# Patient Record
Sex: MALE
[De-identification: names, ages, dates, MRNs, and addresses within clinical notes are randomized per-mention and may not be internally consistent; named-entity substitution may affect disease eponyms.]

---

## 2017-04-02 ENCOUNTER — RESULT REVIEW (OUTPATIENT)
Age: 67
End: 2017-04-02

## 2017-04-03 ENCOUNTER — APPOINTMENT (OUTPATIENT)
Dept: DERMATOLOGY | Facility: CLINIC | Age: 67
End: 2017-04-03

## 2017-04-03 ENCOUNTER — LABORATORY RESULT (OUTPATIENT)
Age: 67
End: 2017-04-03

## 2017-04-03 VITALS
WEIGHT: 205 LBS | HEIGHT: 75 IN | BODY MASS INDEX: 25.49 KG/M2 | SYSTOLIC BLOOD PRESSURE: 140 MMHG | DIASTOLIC BLOOD PRESSURE: 78 MMHG

## 2017-04-03 DIAGNOSIS — Z78.9 OTHER SPECIFIED HEALTH STATUS: ICD-10-CM

## 2017-04-03 DIAGNOSIS — Z80.8 FAMILY HISTORY OF MALIGNANT NEOPLASM OF OTHER ORGANS OR SYSTEMS: ICD-10-CM

## 2017-04-03 DIAGNOSIS — Z85.828 PERSONAL HISTORY OF OTHER MALIGNANT NEOPLASM OF SKIN: ICD-10-CM

## 2017-04-03 DIAGNOSIS — L11.1 TRANSIENT ACANTHOLYTIC DERMATOSIS [GROVER]: ICD-10-CM

## 2017-04-03 DIAGNOSIS — Z86.59 PERSONAL HISTORY OF OTHER MENTAL AND BEHAVIORAL DISORDERS: ICD-10-CM

## 2017-04-03 DIAGNOSIS — D48.5 NEOPLASM OF UNCERTAIN BEHAVIOR OF SKIN: ICD-10-CM

## 2017-04-03 PROBLEM — Z00.00 ENCOUNTER FOR PREVENTIVE HEALTH EXAMINATION: Status: ACTIVE | Noted: 2017-04-03

## 2017-04-17 ENCOUNTER — APPOINTMENT (OUTPATIENT)
Dept: DERMATOLOGY | Facility: CLINIC | Age: 67
End: 2017-04-17

## 2017-04-17 VITALS — SYSTOLIC BLOOD PRESSURE: 140 MMHG | DIASTOLIC BLOOD PRESSURE: 80 MMHG

## 2017-04-17 DIAGNOSIS — C44.320 SQUAMOUS CELL CARCINOMA OF SKIN OF UNSPECIFIED PARTS OF FACE: ICD-10-CM

## 2017-04-26 ENCOUNTER — APPOINTMENT (OUTPATIENT)
Dept: DERMATOLOGY | Facility: CLINIC | Age: 67
End: 2017-04-26

## 2017-05-09 ENCOUNTER — APPOINTMENT (OUTPATIENT)
Dept: DERMATOLOGY | Facility: CLINIC | Age: 67
End: 2017-05-09

## 2017-06-01 ENCOUNTER — APPOINTMENT (OUTPATIENT)
Dept: DERMATOLOGY | Facility: CLINIC | Age: 67
End: 2017-06-01

## 2017-06-01 VITALS
SYSTOLIC BLOOD PRESSURE: 124 MMHG | BODY MASS INDEX: 25.49 KG/M2 | HEIGHT: 75 IN | DIASTOLIC BLOOD PRESSURE: 70 MMHG | WEIGHT: 205 LBS

## 2017-06-01 DIAGNOSIS — C44.41 BASAL CELL CARCINOMA OF SKIN OF SCALP AND NECK: ICD-10-CM

## 2017-06-01 DIAGNOSIS — C44.619 BASAL CELL CARCINOMA OF SKIN OF LEFT UPPER LIMB, INCLUDING SHOULDER: ICD-10-CM

## 2017-06-06 ENCOUNTER — APPOINTMENT (OUTPATIENT)
Dept: DERMATOLOGY | Facility: CLINIC | Age: 67
End: 2017-06-06

## 2017-07-13 ENCOUNTER — APPOINTMENT (OUTPATIENT)
Dept: DERMATOLOGY | Facility: CLINIC | Age: 67
End: 2017-07-13

## 2017-07-13 VITALS — DIASTOLIC BLOOD PRESSURE: 80 MMHG | SYSTOLIC BLOOD PRESSURE: 128 MMHG

## 2017-07-13 DIAGNOSIS — D04.39 CARCINOMA IN SITU OF SKIN OF OTHER PARTS OF FACE: ICD-10-CM

## 2017-07-13 DIAGNOSIS — L57.0 ACTINIC KERATOSIS: ICD-10-CM

## 2018-07-28 PROBLEM — D04.39 SQUAMOUS CELL CARCINOMA IN SITU OF SKIN OF FOREHEAD: Status: ACTIVE | Noted: 2017-05-09

## 2018-07-28 PROBLEM — Z80.8 FAMILY HISTORY OF SKIN CANCER: Status: INACTIVE | Noted: 2017-04-03

## 2018-07-28 PROBLEM — C44.619 BASAL CELL CARCINOMA OF LEFT SHOULDER: Status: ACTIVE | Noted: 2017-04-17

## 2018-07-28 PROBLEM — C44.41 BASAL CELL CARCINOMA OF NECK: Status: ACTIVE | Noted: 2017-04-17

## 2018-07-28 PROBLEM — Z85.828 HISTORY OF BASAL CELL CARCINOMA: Status: RESOLVED | Noted: 2017-04-03 | Resolved: 2018-07-28

## 2021-07-15 ENCOUNTER — FORM ENCOUNTER (OUTPATIENT)
Age: 71
End: 2021-07-15

## 2021-08-03 ENCOUNTER — APPOINTMENT (OUTPATIENT)
Dept: PULMONOLOGY | Facility: CLINIC | Age: 71
End: 2021-08-03
Payer: MEDICARE

## 2021-08-03 VITALS
RESPIRATION RATE: 16 BRPM | WEIGHT: 226 LBS | DIASTOLIC BLOOD PRESSURE: 95 MMHG | BODY MASS INDEX: 28.1 KG/M2 | SYSTOLIC BLOOD PRESSURE: 143 MMHG | HEIGHT: 75 IN | HEART RATE: 84 BPM | TEMPERATURE: 97.7 F

## 2021-08-03 DIAGNOSIS — E78.5 HYPERLIPIDEMIA, UNSPECIFIED: ICD-10-CM

## 2021-08-03 DIAGNOSIS — G47.19 OTHER HYPERSOMNIA: ICD-10-CM

## 2021-08-03 DIAGNOSIS — I10 ESSENTIAL (PRIMARY) HYPERTENSION: ICD-10-CM

## 2021-08-03 DIAGNOSIS — Z87.09 PERSONAL HISTORY OF OTHER DISEASES OF THE RESPIRATORY SYSTEM: ICD-10-CM

## 2021-08-03 DIAGNOSIS — Z82.49 FAMILY HISTORY OF ISCHEMIC HEART DISEASE AND OTHER DISEASES OF THE CIRCULATORY SYSTEM: ICD-10-CM

## 2021-08-03 DIAGNOSIS — Z80.9 FAMILY HISTORY OF MALIGNANT NEOPLASM, UNSPECIFIED: ICD-10-CM

## 2021-08-03 DIAGNOSIS — Z82.5 FAMILY HISTORY OF ASTHMA AND OTHER CHRONIC LOWER RESPIRATORY DISEASES: ICD-10-CM

## 2021-08-03 DIAGNOSIS — Z78.9 OTHER SPECIFIED HEALTH STATUS: ICD-10-CM

## 2021-08-03 DIAGNOSIS — R06.83 SNORING: ICD-10-CM

## 2021-08-03 PROCEDURE — 99215 OFFICE O/P EST HI 40 MIN: CPT

## 2021-08-03 RX ORDER — LOVASTATIN 20 MG/1
20 TABLET ORAL
Refills: 0 | Status: ACTIVE | COMMUNITY

## 2021-08-03 RX ORDER — CHROMIUM 200 MCG
TABLET ORAL
Refills: 0 | Status: ACTIVE | COMMUNITY

## 2021-08-03 RX ORDER — ESCITALOPRAM OXALATE 10 MG/1
10 TABLET, FILM COATED ORAL
Refills: 0 | Status: ACTIVE | COMMUNITY

## 2021-08-03 RX ORDER — LISINOPRIL 10 MG/1
10 TABLET ORAL
Refills: 0 | Status: ACTIVE | COMMUNITY

## 2021-08-03 RX ORDER — GINSENG 100 MG
CAPSULE ORAL
Refills: 0 | Status: ACTIVE | COMMUNITY

## 2021-08-05 PROBLEM — R06.83 SNORING: Status: ACTIVE | Noted: 2021-08-05

## 2021-08-05 PROBLEM — Z80.9 FAMILY HISTORY OF MALIGNANT NEOPLASM: Status: ACTIVE | Noted: 2021-08-05

## 2021-08-05 PROBLEM — Z82.5 FAMILY HISTORY OF CHRONIC OBSTRUCTIVE PULMONARY DISEASE: Status: ACTIVE | Noted: 2021-08-05

## 2021-08-05 PROBLEM — Z82.49 FAMILY HISTORY OF CONGESTIVE HEART FAILURE: Status: ACTIVE | Noted: 2021-08-05

## 2021-08-05 PROBLEM — Z82.49 FAMILY HISTORY OF HYPERTENSION: Status: ACTIVE | Noted: 2021-08-05

## 2021-08-05 PROBLEM — G47.19 EXCESSIVE DAYTIME SLEEPINESS: Status: ACTIVE | Noted: 2021-08-05

## 2021-08-05 NOTE — PHYSICAL EXAM
[Normal Appearance] : normal appearance [General Appearance - In No Acute Distress] : no acute distress [Normal Conjunctiva] : the conjunctiva exhibited no abnormalities [Erythema] : erythema of the pharynx [IV] : IV [Heart Rate And Rhythm] : heart rate was normal and rhythm regular [Neck Appearance] : the appearance of the neck was normal [Heart Sounds] : normal S1 and S2 [Murmurs] : no murmurs [] : no respiratory distress [Respiration, Rhythm And Depth] : normal respiratory rhythm and effort [Exaggerated Use Of Accessory Muscles For Inspiration] : no accessory muscle use [Auscultation Breath Sounds / Voice Sounds] : lungs were clear to auscultation bilaterally [Involuntary Movements] : no involuntary movements were seen [Cyanosis, Localized] : no localized cyanosis [No Focal Deficits] : no focal deficits [FreeTextEntry1] : patient tries to answer questions but goes into his high school days and is not able to provide much history
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
## 2021-08-05 NOTE — REVIEW OF SYSTEMS
[Recent Wt Gain (___ Lbs)] : recent [unfilled] ~Ulb weight gain [Nasal Congestion] : nasal congestion [Snoring] : snoring [Postnasal Drip] : postnasal drip [Witnessed Apneas] : witnessed apnea [Nocturia] : nocturia [Arthralgias] : arthralgias [Depression] : depression [Fatigue] : no fatigue [A.M. Dry Mouth] : no a.m. dry mouth [Chest Pain] : no chest pain [CHF] : no congestive heart failure [Thyroid Disease] : no thyroid disease [Diabetes] : no diabetes  [Anemia] : no anemia [History of Iron Deficiency] : no history of iron deficiency [A.M. Headache] : no headache present upon awakening [Heartburn] : no heartburn [FreeTextEntry8] : h/o hay fever

## 2021-10-07 ENCOUNTER — APPOINTMENT (OUTPATIENT)
Dept: SLEEP CENTER | Facility: CLINIC | Age: 71
End: 2021-10-07